# Patient Record
Sex: FEMALE | Race: WHITE | Employment: OTHER | URBAN - METROPOLITAN AREA
[De-identification: names, ages, dates, MRNs, and addresses within clinical notes are randomized per-mention and may not be internally consistent; named-entity substitution may affect disease eponyms.]

---

## 2011-07-01 LAB — COLONOSCOPY, EXTERNAL: NORMAL

## 2011-07-26 LAB — COLONOSCOPY, EXTERNAL: NORMAL

## 2018-02-27 ENCOUNTER — HOSPITAL ENCOUNTER (OUTPATIENT)
Dept: CT IMAGING | Age: 58
Discharge: HOME | End: 2018-02-27
Payer: SELF-PAY

## 2018-02-27 DIAGNOSIS — Z00.00 PREVENTATIVE HEALTH CARE: ICD-10-CM

## 2018-02-27 PROCEDURE — 75571 CT HRT W/O DYE W/CA TEST: CPT

## 2018-02-28 NOTE — CARDIO/PULMONARY
This patient left a voice message for me asking for her coronary artery CT score. I called her back and shared her coronary artery CT score of 167 with her. I also shared that 148 of this total score was in a single vessel. We discussed the meaning of this score. I also shared with her that there was an additional finding on the chest reading that she would want to discuss with her PCP. Patient has no further questions at this time. Patient will follow up with her PCP.

## 2018-02-28 NOTE — CARDIO/PULMONARY
Cardiac Rehab: Spoke with patient about Calcium Scoring results 167. Discussed significance of results, and CAD risk factors. Pt reported that she was being screened b/c of family hx. Discussed Risk Factor relevant for CAD. She is on meds for HTN and high cholesterol; reported she has been told she is pre-DM. Pt is 1/2 ppd smoker. Discussed heart healthy/low sodium diet management and exercise. Pt requested info on metabolic syndrome, reading labels for jeremías, salt and fat content, learning about low-fat eating and smoking cessation. Handouts sent to pt. Also discussed incidental finding of 3 mm pulm nodule in RML. Pt indicated she has already placed f/u call with PCP.

## 2018-03-21 ENCOUNTER — TELEPHONE (OUTPATIENT)
Dept: CARDIAC REHAB | Age: 58
End: 2018-03-21

## 2018-03-21 NOTE — TELEPHONE ENCOUNTER
3/21/2018 Cardiac Wellness: Faxed Ms. Yandel West heart ct scan results to physician   (Dr. Krishna Sarkar) per Juan Hammonds request.   Amy Maldonado

## 2018-06-12 LAB — MICROALBUMIN UR TEST STR-MCNC: NEGATIVE MG/DL

## 2018-09-26 LAB
HBA1C MFR BLD HPLC: 5.3 %
PAP SMEAR, EXTERNAL: NORMAL

## 2018-09-27 LAB — MAMMOGRAPHY, EXTERNAL: NORMAL

## 2020-01-29 LAB
CREATININE, EXTERNAL: 0.65
LDL-C, EXTERNAL: 107

## 2020-03-12 ENCOUNTER — OP HISTORICAL/CONVERTED ENCOUNTER (OUTPATIENT)
Dept: OTHER | Age: 60
End: 2020-03-12

## 2020-07-16 VITALS
OXYGEN SATURATION: 97 % | HEART RATE: 96 BPM | WEIGHT: 143 LBS | SYSTOLIC BLOOD PRESSURE: 120 MMHG | RESPIRATION RATE: 12 BRPM | TEMPERATURE: 98.1 F | DIASTOLIC BLOOD PRESSURE: 70 MMHG | HEIGHT: 64 IN | BODY MASS INDEX: 24.41 KG/M2

## 2020-07-16 DIAGNOSIS — R87.615: ICD-10-CM

## 2020-07-16 PROBLEM — E55.9 VITAMIN D DEFICIENCY: Status: ACTIVE | Noted: 2020-07-16

## 2020-07-16 PROBLEM — B36.9 OTOMYCOSIS: Status: ACTIVE | Noted: 2020-07-16

## 2020-07-16 PROBLEM — H91.90 HEARING LOSS: Status: ACTIVE | Noted: 2020-07-16

## 2020-07-16 PROBLEM — I10 ESSENTIAL HYPERTENSION: Status: ACTIVE | Noted: 2020-07-16

## 2020-07-16 PROBLEM — H62.40 OTOMYCOSIS: Status: ACTIVE | Noted: 2020-07-16

## 2020-07-16 PROBLEM — E78.2 MIXED HYPERLIPIDEMIA: Status: ACTIVE | Noted: 2020-07-16

## 2020-07-16 PROBLEM — H60.60 CHRONIC OTITIS EXTERNA: Status: ACTIVE | Noted: 2020-07-16

## 2020-07-16 RX ORDER — CHOLECALCIFEROL (VITAMIN D3) 125 MCG
CAPSULE ORAL
COMMUNITY
End: 2021-06-17

## 2020-07-16 RX ORDER — IBUPROFEN 600 MG/1
TABLET ORAL
COMMUNITY
End: 2021-06-17

## 2020-07-16 RX ORDER — SULFAMETHOXAZOLE AND TRIMETHOPRIM 800; 160 MG/1; MG/1
1 TABLET ORAL 2 TIMES DAILY
COMMUNITY
End: 2021-05-21 | Stop reason: ALTCHOICE

## 2020-07-16 RX ORDER — METRONIDAZOLE 7.5 MG/G
CREAM TOPICAL 2 TIMES DAILY
COMMUNITY
End: 2021-06-17

## 2020-07-16 RX ORDER — TRIAMCINOLONE ACETONIDE 0.25 MG/G
OINTMENT TOPICAL 2 TIMES DAILY
COMMUNITY
End: 2021-06-17

## 2020-07-16 RX ORDER — MOMETASONE FUROATE 1 MG/G
OINTMENT TOPICAL DAILY
COMMUNITY
End: 2021-06-17

## 2020-07-16 RX ORDER — EPINEPHRINE 0.3 MG/.3ML
0.3 INJECTION SUBCUTANEOUS
COMMUNITY

## 2020-07-16 RX ORDER — BISMUTH SUBSALICYLATE 262 MG
1 TABLET,CHEWABLE ORAL DAILY
COMMUNITY

## 2020-07-16 RX ORDER — ASPIRIN 81 MG/1
TABLET ORAL DAILY
COMMUNITY

## 2020-07-16 RX ORDER — IBUPROFEN 200 MG
1 TABLET ORAL EVERY 24 HOURS
COMMUNITY

## 2020-07-16 RX ORDER — AMLODIPINE BESYLATE 2.5 MG/1
TABLET ORAL DAILY
COMMUNITY
End: 2020-08-04

## 2020-07-16 RX ORDER — LOSARTAN POTASSIUM 100 MG/1
100 TABLET ORAL DAILY
COMMUNITY
End: 2021-04-23 | Stop reason: SDUPTHER

## 2020-07-16 RX ORDER — PRAVASTATIN SODIUM 40 MG/1
40 TABLET ORAL
COMMUNITY
End: 2021-01-29

## 2020-08-04 RX ORDER — AMLODIPINE BESYLATE 2.5 MG/1
TABLET ORAL
Qty: 90 TAB | Refills: 1 | Status: SHIPPED | OUTPATIENT
Start: 2020-08-04 | End: 2021-02-09

## 2021-02-17 DIAGNOSIS — E55.9 VITAMIN D DEFICIENCY, UNSPECIFIED: ICD-10-CM

## 2021-02-17 RX ORDER — ACETAMINOPHEN 500 MG
TABLET ORAL
Qty: 90 CAP | Refills: 3 | Status: SHIPPED | OUTPATIENT
Start: 2021-02-17

## 2021-04-23 ENCOUNTER — TELEPHONE (OUTPATIENT)
Dept: FAMILY MEDICINE CLINIC | Age: 61
End: 2021-04-23

## 2021-04-23 RX ORDER — LOSARTAN POTASSIUM 100 MG/1
100 TABLET ORAL DAILY
Qty: 30 TAB | Refills: 0 | Status: SHIPPED | OUTPATIENT
Start: 2021-04-23 | End: 2021-05-21

## 2021-04-23 NOTE — TELEPHONE ENCOUNTER
Request for refill on patients losartan has been going around in circles. Patient needs refill. Has scheduled an appointment for May 7.

## 2021-04-23 NOTE — TELEPHONE ENCOUNTER
This is the first I've seen of this. I refilled 30 tablets to get her to the appointment since she is overdue.

## 2021-05-11 RX ORDER — PRAVASTATIN SODIUM 40 MG/1
TABLET ORAL
Qty: 90 TAB | Refills: 0 | Status: SHIPPED | OUTPATIENT
Start: 2021-05-11 | End: 2021-08-03

## 2021-05-21 RX ORDER — LOSARTAN POTASSIUM 100 MG/1
TABLET ORAL
Qty: 30 TABLET | Refills: 0 | Status: SHIPPED | OUTPATIENT
Start: 2021-05-21

## 2021-05-26 RX ORDER — AMLODIPINE BESYLATE 2.5 MG/1
TABLET ORAL
Qty: 90 TABLET | Refills: 0 | Status: SHIPPED | OUTPATIENT
Start: 2021-05-26

## 2021-06-17 ENCOUNTER — OFFICE VISIT (OUTPATIENT)
Dept: FAMILY MEDICINE CLINIC | Age: 61
End: 2021-06-17
Payer: COMMERCIAL

## 2021-06-17 VITALS
RESPIRATION RATE: 16 BRPM | SYSTOLIC BLOOD PRESSURE: 142 MMHG | HEIGHT: 63 IN | BODY MASS INDEX: 25.52 KG/M2 | DIASTOLIC BLOOD PRESSURE: 80 MMHG | HEART RATE: 84 BPM | WEIGHT: 144 LBS | TEMPERATURE: 97.5 F | OXYGEN SATURATION: 98 %

## 2021-06-17 DIAGNOSIS — R10.9 ABDOMINAL PAIN, UNSPECIFIED ABDOMINAL LOCATION: ICD-10-CM

## 2021-06-17 DIAGNOSIS — E66.3 OVERWEIGHT (BMI 25.0-29.9): ICD-10-CM

## 2021-06-17 DIAGNOSIS — I10 ESSENTIAL HYPERTENSION: ICD-10-CM

## 2021-06-17 DIAGNOSIS — I83.813 VARICOSE VEINS OF BILATERAL LOWER EXTREMITIES WITH PAIN: ICD-10-CM

## 2021-06-17 DIAGNOSIS — R91.8 ABNORMAL FINDING ON LUNG IMAGING: ICD-10-CM

## 2021-06-17 DIAGNOSIS — Z12.11 COLON CANCER SCREENING: ICD-10-CM

## 2021-06-17 DIAGNOSIS — Z71.2 ENCOUNTER TO DISCUSS TEST RESULTS: ICD-10-CM

## 2021-06-17 DIAGNOSIS — M81.0 AGE-RELATED OSTEOPOROSIS WITHOUT CURRENT PATHOLOGICAL FRACTURE: ICD-10-CM

## 2021-06-17 DIAGNOSIS — E78.2 MIXED HYPERLIPIDEMIA: ICD-10-CM

## 2021-06-17 DIAGNOSIS — F17.210 TOBACCO DEPENDENCE DUE TO CIGARETTES: ICD-10-CM

## 2021-06-17 DIAGNOSIS — Z00.00 WELLNESS EXAMINATION: Primary | ICD-10-CM

## 2021-06-17 DIAGNOSIS — Z13.220 SCREENING FOR HYPERLIPIDEMIA: ICD-10-CM

## 2021-06-17 DIAGNOSIS — Z28.20 VACCINE REFUSED BY PATIENT: ICD-10-CM

## 2021-06-17 DIAGNOSIS — E55.9 VITAMIN D DEFICIENCY: ICD-10-CM

## 2021-06-17 DIAGNOSIS — Z13.31 POSITIVE DEPRESSION SCREENING: ICD-10-CM

## 2021-06-17 DIAGNOSIS — R42 DIZZINESS: ICD-10-CM

## 2021-06-17 DIAGNOSIS — F33.1 MODERATE EPISODE OF RECURRENT MAJOR DEPRESSIVE DISORDER (HCC): ICD-10-CM

## 2021-06-17 DIAGNOSIS — R25.2 LEG CRAMPS: ICD-10-CM

## 2021-06-17 PROCEDURE — 99396 PREV VISIT EST AGE 40-64: CPT | Performed by: NURSE PRACTITIONER

## 2021-06-17 PROCEDURE — 99215 OFFICE O/P EST HI 40 MIN: CPT | Performed by: NURSE PRACTITIONER

## 2021-06-17 RX ORDER — ESCITALOPRAM OXALATE 10 MG/1
10 TABLET ORAL DAILY
Qty: 90 TABLET | Refills: 0 | Status: SHIPPED | OUTPATIENT
Start: 2021-06-17

## 2021-06-17 NOTE — PROGRESS NOTES
Subjective  Chief Complaint   Patient presents with    Annual Wellness Visit     fasting labs     HPI:  Kim Herbert is a 57 y.o. female. Patient presents for wellness, fasting labs, and management of HTN and hyperlipidemia. She also has several concerns she'd like to discuss. Understands this is considered two appointments and there will be a copay for the chronic disease management and acute concern portion of the visit. Continues to smoke 1PPD. Not interested or motivated to quit at this time. Has patches at home and uses them prn when she does not want to smoke.     For wellness-  Immunizations:  Flu: due in fall  COVID: declines  Tetanus: 2017  Shingrix: complete  Pneumovax 23: complete    HCV screening: complete  LMP: menopause  Pap: 3 years ago  Mammo: 12/2020  Colon cancer screening: due  Smoking status: current  Low dose CT scan: 3/2020    Moods:  PHQ2:  3 most recent PHQ Screens 6/17/2021   Little interest or pleasure in doing things Nearly every day   Feeling down, depressed, irritable, or hopeless More than half the days   Total Score PHQ 2 5   Trouble falling or staying asleep, or sleeping too much Nearly every day   Feeling tired or having little energy Nearly every day   Poor appetite, weight loss, or overeating Several days   Feeling bad about yourself - or that you are a failure or have let yourself or your family down More than half the days   Trouble concentrating on things such as school, work, reading, or watching TV Nearly every day   Moving or speaking so slowly that other people could have noticed; or the opposite being so fidgety that others notice Not at all   Thoughts of being better off dead, or hurting yourself in some way Not at all   PHQ 9 Score 17   How difficult have these problems made it for you to do your work, take care of your home and get along with others Very difficult   Diet: trying to eat healthy  Exercise: minimal  Vision exams: annual  Dental exams: every 6 months    For chronic disease management and acute concerns-  Medications reviewed, taking as prescribed with no known side effects. Management of HTN-  Current meds: Amlodipine 5mg, Losartan 100mg  Home BP readings: does not check, has a cuff  High salt intake:  Stays hydrated:  Regular exercise:  Denies lightheadedness, dizziness, headaches, chest pain, palpitations, lower extremity edema, and calf pain with exertion. Concerned about findings on low-dose CT scan 3/2020. Complaining of ongoing bilateral lower extremity muscle cramps. Continues to take magnesium daily. Was referred to vascular surgeon for evaluation of varicose veins last year but did not go due to Covid. Requesting new referral today. Requesting to review recent [facility] results which demonstrated osteoporosis. Requesting A1c checked today. Concerned about diabetes due to recent increased sugar intake.       Past Medical History:   Diagnosis Date    Chronic otitis externa     Encounter for screening for lung cancer 03/12/2020    LDCT, repeat in 12 months    Essential hypertension     Hearing loss     hearing aides    Mixed hyperlipidemia     Otomycosis      Family History   Problem Relation Age of Onset    Cancer Father         Prostate    Heart Disease Father     Heart Disease Paternal Uncle     Heart Disease Maternal Grandmother     Cancer Maternal Grandmother         colon    Heart Disease Maternal Grandfather     Heart Disease Paternal Grandmother     Cancer Brother         x2: prostate    Hypertension Brother      Social History     Socioeconomic History    Marital status:      Spouse name: Not on file    Number of children: Not on file    Years of education: Not on file    Highest education level: Not on file   Occupational History    Not on file   Tobacco Use    Smoking status: Current Every Day Smoker     Packs/day: 1.00     Years: 34.00     Pack years: 34.00    Smokeless tobacco: Never Used Vaping Use    Vaping Use: Never used   Substance and Sexual Activity    Alcohol use: Yes     Comment: occasional    Drug use: Never    Sexual activity: Not on file   Other Topics Concern    Not on file   Social History Narrative    Not on file     Social Determinants of Health     Financial Resource Strain:     Difficulty of Paying Living Expenses:    Food Insecurity:     Worried About Running Out of Food in the Last Year:     [address] in the Last Year:    Transportation Needs:     Lack of Transportation (Medical):  Lack of Transportation (Non-Medical):    Physical Activity:     Days of Exercise per Week:     Minutes of Exercise per Session:    Stress:     Feeling of Stress :    Social Connections:     Frequency of Communication with Friends and Family:     Frequency of Social Gatherings with Friends and Family:     Attends Buddhism Services:     Active Member of Clubs or Organizations:     Attends Club or Organization Meetings:     Marital Status:    Intimate Partner Violence:     Fear of Current or Ex-Partner:     Emotionally Abused:     Physically Abused:     Sexually Abused:      Current Outpatient Medications on File Prior to Visit   Medication Sig Dispense Refill    CALCIUM PO Take  by mouth.  MAGNESIUM PO Take  by mouth.  amLODIPine (NORVASC) 2.5 mg tablet TAKE 1 TABLET BY MOUTH EVERY DAY 90 Tablet 0    losartan (COZAAR) 100 mg tablet TAKE 1 TABLET BY MOUTH EVERY DAY 30 Tablet 0    pravastatin (PRAVACHOL) 40 mg tablet TAKE 1 TABLET BY MOUTH EVERY DAY IN THE EVENING 90 Tab 0    cholecalciferol (VITAMIN D3) (2,000 UNITS /50 MCG) cap capsule TAKE 1 CAPSULE BY MOUTH EVERY DAY 90 Cap 3    aspirin delayed-release 81 mg tablet Take  by mouth daily.  EPINEPHrine (EPIPEN) 0.3 mg/0.3 mL injection 0.3 mg by IntraMUSCular route once as needed for Allergic Response.  multivitamin (ONE A DAY) tablet Take 1 Tab by mouth daily.       nicotine (NICODERM CQ) 21 mg/24 hr 1 Patch by TransDERmal route every twenty-four (24) hours.  Lactobacillus acidophilus (PROBIOTIC PO) Take  by mouth.  [DISCONTINUED] cholecalciferol, vitamin D3, (Vitamin D3) 50 mcg (2,000 unit) tab Take  by mouth. (Patient not taking: Reported on 6/17/2021)      [DISCONTINUED] ibuprofen (MOTRIN) 600 mg tablet Take  by mouth every six (6) hours as needed for Pain. (Patient not taking: Reported on 6/17/2021)      [DISCONTINUED] metroNIDAZOLE (METROCREAM) 0.75 % topical cream Apply  to affected area two (2) times a day. Use a thin layer to affected areas after washing (Patient not taking: Reported on 6/17/2021)      [DISCONTINUED] mometasone (ELOCON) 0.1 % ointment Apply  to affected area daily. (Patient not taking: Reported on 6/17/2021)      [DISCONTINUED] triamcinolone acetonide (KENALOG) 0.025 % ointment Apply  to affected area two (2) times a day. use thin layer (Patient not taking: Reported on 6/17/2021)       No current facility-administered medications on file prior to visit. Allergies   Allergen Reactions    Bactrim [Sulfamethoprim] Hives    Bee Venom Protein (Honey Bee) Swelling     Review of Systems   Constitutional: Negative for chills, fever and weight loss. HENT: Positive for hearing loss. Negative for congestion, ear pain, sinus pain and sore throat. Denies difficulty swallowing. Eyes: Negative for blurred vision. Respiratory: Positive for cough (Attributed to smoking), shortness of breath (Attributed to smoking) and wheezing (Attributed to smoking). Cardiovascular: Negative for chest pain, palpitations, claudication and leg swelling. Gastrointestinal: Positive for abdominal pain and constipation (Attributed to calcium intake, takes magnesium for constipation and leg cramps). Negative for diarrhea and heartburn. Genitourinary: Negative for dysuria. Musculoskeletal: Negative for joint pain and myalgias.    Neurological: Positive for dizziness. Negative for tingling, weakness and headaches. Psychiatric/Behavioral: Positive for depression. Negative for suicidal ideas. The patient is nervous/anxious. Objective  Visit Vitals  BP (!) 142/80   Pulse 84   Temp 97.5 °F (36.4 °C)   Resp 16   Ht 5' 3.25\" (1.607 m)   Wt 144 lb (65.3 kg)   SpO2 98%   BMI 25.31 kg/m²       Physical Exam  Vitals and nursing note reviewed. Constitutional:       General: She is not in acute distress. Appearance: Normal appearance. She is overweight. HENT:      Head: Normocephalic. Mouth/Throat:      Pharynx: No posterior oropharyngeal erythema. Eyes:      Extraocular Movements: Extraocular movements intact. Neck:      Thyroid: No thyroid mass, thyromegaly or thyroid tenderness. Cardiovascular:      Rate and Rhythm: Normal rate and regular rhythm. Heart sounds: Normal heart sounds. Pulmonary:      Effort: Pulmonary effort is normal.      Breath sounds: Normal breath sounds. Abdominal:      General: Bowel sounds are normal.      Palpations: Abdomen is soft. There is no mass. Tenderness: There is no abdominal tenderness. Musculoskeletal:         General: Normal range of motion. Cervical back: Normal range of motion and neck supple. Right lower leg: No edema. Left lower leg: No edema. Lymphadenopathy:      Cervical: No cervical adenopathy. Upper Body:      Right upper body: No supraclavicular adenopathy. Left upper body: No supraclavicular adenopathy. Skin:     General: Skin is warm and dry. Neurological:      General: No focal deficit present. Mental Status: She is alert and oriented to person, place, and time. Psychiatric:         Mood and Affect: Mood normal.         Behavior: Behavior normal.         Thought Content: Thought content normal.         Judgment: Judgment normal.          Assessment & Plan      ICD-10-CM ICD-9-CM    1. Wellness examination  Z00.00 V70.0    2.  Screening for hyperlipidemia  Z13.220 V77.91 CBC WITH AUTOMATED DIFF      LIPID PANEL      METABOLIC PANEL, COMPREHENSIVE   3. Overweight (BMI 25.0-29. 9)  E66.3 278.02    4. Vaccine refused by patient  Z28.20 V64.09    5. Encounter for screening mammogram for malignant neoplasm of breast  Z12.31 V76.12    6. Colon cancer screening  Z12.11 V76.51 COLOGUARD TEST (FECAL DNA COLORECTAL CANCER SCREENING)   7. Tobacco dependence due to cigarettes  F17.210 305.1 CT LOW DOSE LUNG CANCER SCREENING   8. Positive depression screening  Z13.31 796.4    9. Essential hypertension  U88 128.8 METABOLIC PANEL, COMPREHENSIVE   10. Mixed hyperlipidemia  E78.2 272.2 CBC WITH AUTOMATED DIFF      LIPID PANEL   11. Vitamin D deficiency  E55.9 268.9 VITAMIN D, 25 HYDROXY   12. Abnormal finding on lung imaging  R91.8 793.19    13. Leg cramps  R25.2 729.82    14. Varicose veins of bilateral lower extremities with pain  I83.813 454.8    15. Moderate episode of recurrent major depressive disorder (HCC)  F33.1 296.32 TSH RFX ON ABNORMAL TO FREE T4      escitalopram oxalate (LEXAPRO) 10 mg tablet   16. Abdominal pain, unspecified abdominal location  R10.9 789.00    17. Dizziness  R42 780.4    18. Encounter to discuss test results  Z71.2 V65.49    19. Age-related osteoporosis without current pathological fracture  M81.0 733.01      Diagnoses and all orders for this visit:    1. Wellness examination  We are getting patient up-to-date on preventative measures as listed. 2. Screening for hyperlipidemia  -     CBC WITH AUTOMATED DIFF  -     LIPID PANEL  -     METABOLIC PANEL, COMPREHENSIVE    3. Overweight (BMI 25.0-29. 9)  Discussed active lifestyle approaches including good nutrition, exercise goals, sleep hygiene, and proper weight management. 4. Vaccine refused by patient  Declines Covid vaccine. 5. Colon cancer screening  Discussed pros and cons of colonoscopy versus Cologuard screening for colon cancer.   She prefers to take complete Cologuard at this time.  Advised to verify insurance coverage prior to completing test.  -     COLOGUARD TEST (FECAL DNA COLORECTAL CANCER SCREENING)    6. Tobacco dependence due to cigarettes  Overdue for annual screening. Remains uninterested and unmotivated to quit smoking at this time. Reinforced the importance of smoking cessation. -     CT LOW DOSE LUNG CANCER SCREENING; Future    7. Positive depression screening  Positive finding today. Discussed below. 8. Essential hypertension  BP is above goal on 2 readings in the office today. Before adjusting medication, she is going to check readings daily over the next few days and will call if consistently greater then 140/90 on either number. Check BP readings 1 to 2 hours after taking medication and after sitting quietly for 5 minutes with both feet flat on the floor and arm at heart level. Increase regular exercise, limit salt intake, and stay well-hydrated. -     METABOLIC PANEL, COMPREHENSIVE    9. Mixed hyperlipidemia  Checking annual labs as noted. Continue pravastatin daily in the evening. Increase regular exercise and limit fat and cholesterol in diet. -     CBC WITH AUTOMATED DIFF  -     LIPID PANEL    10. Vitamin D deficiency  Vitamin D low when checked last 1/29/2020. Rechecking this today on daily supplement. -     VITAMIN D, 25 HYDROXY    11. Abnormal finding on lung imaging  Reviewed low-dose CT scan results from 3/12/2020 which demonstrated changes consistent with emphysema and chronic bronchitis. She also reports coughing, wheezing, and shortness of breath attributed to smoking. We will check spirometry at her follow-up appointment in 4 to 6 weeks. 12. Leg cramps  Magnesium normal when checked 1/29/2020. She will call back with the name of the vascular specialist she would like to see for referral.    13. Varicose veins of bilateral lower extremities with pain  As above. 14.  Moderate episode of recurrent major depressive disorder [facility])  Depression screening positive today and she reports feeling depressed with mild anxiety. Discussed counseling as a useful adjunct to medication. Discussed side effects are worse during the first week and then improve (feeling stimulated or sedated, upset stomach, dry mouth, headache, or sexual difficulties). Instructed to take medication daily and not stop abruptly. Discussed it can take 6-8 weeks to reach therapeutic dosing. Also encouraged regular exercise, stay hydrated, eat a healthy diet, and get plenty of sleep. Engage in enjoyable activities during periods of anxiety for distraction.      -     TSH RFX ON ABNORMAL TO FREE T4  -     escitalopram oxalate (LEXAPRO) 10 mg tablet; Take 1 Tablet by mouth daily. 15. Abdominal pain, unspecified abdominal location  Mention during review of systems. We will discuss this further at follow-up appointment in 4 to 6 weeks. Also discussed the positive impact daily SSRI may have to improve abdominal pain. 16. Dizziness  Mentioned during review of systems. We will discuss this further at follow-up appointment in 4 to 6 weeks and after she has been monitoring her home BP readings closely. 17. Encounter to discuss test results  Prior to review of [facility] results and after a lengthy visit, patient informed provider she would be transferring care due to concerns about phone system and frustration with office closings during lunch and COVID. [facility] demonstrated mild atherosclerosis which we discussed is a common finding on older patients. [facility] also demonstrated osteoporosis. 18. Age-related osteoporosis without current pathological fracture  Will discuss order for Dexa scan with new PCP. On this date 6/17/2021 I have spent 50 minutes reviewing previous notes, test results and face to face with the patient discussing the diagnosis and treatment plan including wellness, chronic disease management, and acute concerns.       Follow-up and Dispositions · Return in about 4 weeks (around 7/15/2021) for follow up, depression, ? COPD, abdominal pain.            Denise Mason, NP

## 2021-06-17 NOTE — PROGRESS NOTES
Chief Complaint   Patient presents with    Annual Wellness Visit     fasting labs   1. Have you been to the ER, urgent care clinic since your last visit? Hospitalized since your last visit? No    2. Have you seen or consulted any other health care providers outside of the [address] [place] [address] since your last visit? Include any pap smears or colon screening.  No

## 2021-06-17 NOTE — PATIENT INSTRUCTIONS
BP GOAL LESS THAN 140/90 ON BOTH NUMBERS- call with readings in 1-2 weeks Well Visit, Women 48 to 72: Care Instructions Overview Well visits can help you stay healthy. Your doctor has checked your overall health and may have suggested ways to take good care of yourself. Your doctor also may have recommended tests. At home, you can help prevent illness with healthy eating, regular exercise, and other steps. Follow-up care is a key part of your treatment and safety. Be sure to make and go to all appointments, and call your doctor if you are having problems. It's also a good idea to know your test results and keep a list of the medicines you take. How can you care for yourself at home? · Get screening tests that you and your doctor decide on. Screening helps find diseases before any symptoms appear. · Eat healthy foods. Choose fruits, vegetables, whole grains, protein, and low-fat dairy foods. Limit fat, especially saturated fat. Reduce salt in your diet. · Limit alcohol. Have no more than 1 drink a day or 7 drinks a week. · Get at least 30 minutes of exercise on most days of the week. Walking is a good choice. You also may want to do other activities, such as running, swimming, cycling, or playing tennis or team sports. · Reach and stay at a healthy weight. This will lower your risk for many problems, such as obesity, diabetes, heart disease, and high blood pressure. · Do not smoke. Smoking can make health problems worse. If you need help quitting, talk to your doctor about stop-smoking programs and medicines. These can increase your chances of quitting for good. · Care for your mental health. It is easy to get weighed down by worry and stress. Learn strategies to manage stress, like deep breathing and mindfulness, and stay connected with your family and community. If you find you often feel sad or hopeless, talk with your doctor. Treatment can help.  
· Talk to your doctor about whether you have any risk factors for sexually transmitted infections (STIs). You can help prevent STIs if you wait to have sex with a new partner (or partners) until you've each been tested for STIs. It also helps if you use condoms (male or female condoms) and if you limit your sex partners to one person who only has sex with you. Vaccines are available for some STIs. · If you think you may have a problem with alcohol or drug use, talk to your doctor. This includes prescription medicines (such as amphetamines and opioids) and illegal drugs (such as cocaine and methamphetamine). Your doctor can help you figure out what type of treatment is best for you. · Protect your skin from too much sun. When you're outdoors from 10 a.m. to 4 p.m., stay in the shade or cover up with clothing and a hat with a wide brim. Wear sunglasses that block UV rays. Even when it's cloudy, put broad-spectrum sunscreen (SPF 30 or higher) on any exposed skin. · See a dentist one or two times a year for checkups and to have your teeth cleaned. · Wear a seat belt in the car. When should you call for help? Watch closely for changes in your health, and be sure to contact your doctor if you have any problems or symptoms that concern you. Where can you learn more? Go to http://www.gray.com/ Enter Y143 in the search box to learn more about \"Well Visit, Women 50 to 72: Care Instructions. \" Current as of: May 27, 2020               Content Version: 12.8 © 2006-2021 [employer]. Care instructions adapted under license by [employer] (which disclaims liability or warranty for this information). If you have questions about a medical condition or this instruction, always ask your healthcare professional. [place] [address] any warranty or liability for your use of this information.

## 2021-06-18 LAB
25(OH)D3+25(OH)D2 SERPL-MCNC: 40.2 NG/ML (ref 30–100)
ALBUMIN SERPL-MCNC: 4.8 G/DL (ref 3.8–4.8)
ALBUMIN/GLOB SERPL: 1.9 {RATIO} (ref 1.2–2.2)
ALP SERPL-CCNC: 101 IU/L (ref 48–121)
ALT SERPL-CCNC: 20 IU/L (ref 0–32)
AST SERPL-CCNC: 26 IU/L (ref 0–40)
BASOPHILS # BLD AUTO: 0.1 X10E3/UL (ref 0–0.2)
BASOPHILS NFR BLD AUTO: 1 %
BILIRUB SERPL-MCNC: 0.9 MG/DL (ref 0–1.2)
BUN SERPL-MCNC: 9 MG/DL (ref 8–27)
BUN/CREAT SERPL: 13 (ref 12–28)
CALCIUM SERPL-MCNC: 9.9 MG/DL (ref 8.7–10.3)
CHLORIDE SERPL-SCNC: 100 MMOL/L (ref 96–106)
CHOLEST SERPL-MCNC: 213 MG/DL (ref 100–199)
CO2 SERPL-SCNC: 20 MMOL/L (ref 20–29)
CREAT SERPL-MCNC: 0.7 MG/DL (ref 0.57–1)
EOSINOPHIL # BLD AUTO: 0.1 X10E3/UL (ref 0–0.4)
EOSINOPHIL NFR BLD AUTO: 1 %
ERYTHROCYTE [DISTWIDTH] IN BLOOD BY AUTOMATED COUNT: 12.5 % (ref 11.7–15.4)
GLOBULIN SER CALC-MCNC: 2.5 G/DL (ref 1.5–4.5)
GLUCOSE SERPL-MCNC: 109 MG/DL (ref 65–99)
HCT VFR BLD AUTO: 46.2 % (ref 34–46.6)
HDLC SERPL-MCNC: 59 MG/DL
HGB BLD-MCNC: 16 G/DL (ref 11.1–15.9)
IMM GRANULOCYTES # BLD AUTO: 0.1 X10E3/UL (ref 0–0.1)
IMM GRANULOCYTES NFR BLD AUTO: 1 %
LDLC SERPL CALC-MCNC: 127 MG/DL (ref 0–99)
LYMPHOCYTES # BLD AUTO: 1.9 X10E3/UL (ref 0.7–3.1)
LYMPHOCYTES NFR BLD AUTO: 16 %
MCH RBC QN AUTO: 34 PG (ref 26.6–33)
MCHC RBC AUTO-ENTMCNC: 34.6 G/DL (ref 31.5–35.7)
MCV RBC AUTO: 98 FL (ref 79–97)
MONOCYTES # BLD AUTO: 0.7 X10E3/UL (ref 0.1–0.9)
MONOCYTES NFR BLD AUTO: 6 %
NEUTROPHILS # BLD AUTO: 8.7 X10E3/UL (ref 1.4–7)
NEUTROPHILS NFR BLD AUTO: 75 %
PLATELET # BLD AUTO: 248 X10E3/UL (ref 150–450)
POTASSIUM SERPL-SCNC: 5.1 MMOL/L (ref 3.5–5.2)
PROT SERPL-MCNC: 7.3 G/DL (ref 6–8.5)
RBC # BLD AUTO: 4.71 X10E6/UL (ref 3.77–5.28)
SODIUM SERPL-SCNC: 141 MMOL/L (ref 134–144)
TRIGL SERPL-MCNC: 156 MG/DL (ref 0–149)
TSH SERPL DL<=0.005 MIU/L-ACNC: 0.89 UIU/ML (ref 0.45–4.5)
VLDLC SERPL CALC-MCNC: 27 MG/DL (ref 5–40)
WBC # BLD AUTO: 11.5 X10E3/UL (ref 3.4–10.8)

## 2021-06-18 NOTE — PROGRESS NOTES
White blood cell count and neutrophils are elevated, previously normal.  This may indicate she is fighting off an infection. Lipids are elevated and a statin is recommended at this time. Vitamin D level is normal.  Thyroid function is on the low end of normal.  Glucose is a little elevated as it has been in the past.  Her A1c has always been normal. Normal kidney and liver function. Recommend she discuss abnormals and statin medication for cholesterol with her new PCP.     The 10-year ASCVD risk score (Claven ., et al., 2013) is: 13%

## 2021-09-17 ENCOUNTER — TRANSCRIBE ORDER (OUTPATIENT)
Dept: SCHEDULING | Age: 61
End: 2021-09-17

## 2021-09-17 DIAGNOSIS — Z12.2 SCREENING FOR MALIGNANT NEOPLASM OF RESPIRATORY ORGAN: Primary | ICD-10-CM

## 2021-10-19 ENCOUNTER — HOSPITAL ENCOUNTER (OUTPATIENT)
Dept: CT IMAGING | Age: 61
Discharge: HOME | End: 2021-10-19
Payer: COMMERCIAL

## 2021-10-19 VITALS — HEIGHT: 64 IN | WEIGHT: 145 LBS | BODY MASS INDEX: 24.75 KG/M2

## 2021-10-19 DIAGNOSIS — Z12.2 SCREENING FOR MALIGNANT NEOPLASM OF RESPIRATORY ORGAN: ICD-10-CM

## 2021-10-19 PROCEDURE — 71271 CT THORAX LUNG CANCER SCR C-: CPT

## 2022-03-18 PROBLEM — H62.40 OTOMYCOSIS: Status: ACTIVE | Noted: 2020-07-16

## 2022-03-18 PROBLEM — B36.9 OTOMYCOSIS: Status: ACTIVE | Noted: 2020-07-16

## 2022-03-19 PROBLEM — H91.90 HEARING LOSS: Status: ACTIVE | Noted: 2020-07-16

## 2022-03-19 PROBLEM — E55.9 VITAMIN D DEFICIENCY: Status: ACTIVE | Noted: 2020-07-16

## 2022-03-19 PROBLEM — I10 ESSENTIAL HYPERTENSION: Status: ACTIVE | Noted: 2020-07-16

## 2022-03-19 PROBLEM — H60.60 CHRONIC OTITIS EXTERNA: Status: ACTIVE | Noted: 2020-07-16

## 2022-03-19 PROBLEM — E78.2 MIXED HYPERLIPIDEMIA: Status: ACTIVE | Noted: 2020-07-16
